# Patient Record
Sex: MALE | Race: ASIAN | NOT HISPANIC OR LATINO | URBAN - METROPOLITAN AREA
[De-identification: names, ages, dates, MRNs, and addresses within clinical notes are randomized per-mention and may not be internally consistent; named-entity substitution may affect disease eponyms.]

---

## 2024-01-02 ENCOUNTER — EMERGENCY (EMERGENCY)
Facility: HOSPITAL | Age: 24
LOS: 1 days | Discharge: HOME | End: 2024-01-02
Attending: STUDENT IN AN ORGANIZED HEALTH CARE EDUCATION/TRAINING PROGRAM | Admitting: STUDENT IN AN ORGANIZED HEALTH CARE EDUCATION/TRAINING PROGRAM
Payer: SELF-PAY

## 2024-01-02 VITALS
TEMPERATURE: 98 F | RESPIRATION RATE: 17 BRPM | DIASTOLIC BLOOD PRESSURE: 75 MMHG | HEART RATE: 90 BPM | OXYGEN SATURATION: 100 % | SYSTOLIC BLOOD PRESSURE: 116 MMHG

## 2024-01-02 PROCEDURE — 99284 EMERGENCY DEPT VISIT MOD MDM: CPT

## 2024-01-02 PROCEDURE — 99053 MED SERV 10PM-8AM 24 HR FAC: CPT

## 2024-01-02 NOTE — ED ADULT TRIAGE NOTE - CHIEF COMPLAINT QUOTE
Pt c/o toe pain x 1 day. Pt states he tripped and fell down 3 steps. Denies hitting head. No Past Medical History.

## 2024-01-03 VITALS
OXYGEN SATURATION: 98 % | HEART RATE: 85 BPM | TEMPERATURE: 98 F | RESPIRATION RATE: 16 BRPM | SYSTOLIC BLOOD PRESSURE: 139 MMHG | DIASTOLIC BLOOD PRESSURE: 85 MMHG

## 2024-01-03 PROCEDURE — 73630 X-RAY EXAM OF FOOT: CPT | Mod: 26,LT

## 2024-01-03 PROCEDURE — 73660 X-RAY EXAM OF TOE(S): CPT | Mod: 26,LT,76

## 2024-01-03 RX ORDER — OXYCODONE HYDROCHLORIDE 5 MG/1
5 TABLET ORAL ONCE
Refills: 0 | Status: DISCONTINUED | OUTPATIENT
Start: 2024-01-03 | End: 2024-01-03

## 2024-01-03 RX ORDER — IBUPROFEN 200 MG
400 TABLET ORAL ONCE
Refills: 0 | Status: COMPLETED | OUTPATIENT
Start: 2024-01-03 | End: 2024-01-03

## 2024-01-03 RX ADMIN — OXYCODONE HYDROCHLORIDE 5 MILLIGRAM(S): 5 TABLET ORAL at 04:18

## 2024-01-03 RX ADMIN — Medication 400 MILLIGRAM(S): at 03:14

## 2024-01-03 NOTE — ED PROVIDER NOTE - PHYSICAL EXAMINATION
Afebrile, well appearing, neck supple, no rash, rrr, ctabl, abdomen soft and ndnt, no le edema, stable gait.  L Foot: tenderness and ecchymosis to the 5th toe. No open wounds. SILT over S/S/T/DP/SP. Palp DP/PT pulses

## 2024-01-03 NOTE — ED PROVIDER NOTE - OBJECTIVE STATEMENT
23M, denies pmh, who presents with Left 5th toe pain s/p fall down stairs. No blood thinners. Denies any other bodily or head trauma. On ROS, denies headaches, fevers, chills, cough, sputum, cp, sob, abdominal pain, nvd, dysuria, hematuria, recent travel, syncope, black/bloody stools.

## 2024-01-03 NOTE — CONSULT NOTE ADULT - SUBJECTIVE AND OBJECTIVE BOX
· Chief Complaint: The patient is a 23y Male complaining of toe pain.  · HPI Objective Statement: 23M, denies pmh, who presents with Left 5th toe pain s/p fall down stairs. No blood thinners. Denies any other bodily or head trauma. On ROS, denies headaches, fevers, chills, cough, sputum, cp, sob, abdominal pain, nvd, dysuria, hematuria, recent travel, syncope, black/bloody stools.      PAST MEDICAL & SURGICAL HISTORY:      MEDICATIONS  (STANDING):    MEDICATIONS  (PRN):      Allergies    No Known Allergies    Intolerances        VITALS:    Vital Signs Last 24 Hrs  T(C): 36.7 (03 Jan 2024 03:38), Max: 37 (03 Jan 2024 00:13)  T(F): 98.1 (03 Jan 2024 03:38), Max: 98.6 (03 Jan 2024 00:13)  HR: 85 (03 Jan 2024 03:38) (81 - 90)  BP: 139/85 (03 Jan 2024 03:38) (116/75 - 139/85)  BP(mean): 74 (03 Jan 2024 00:13) (74 - 74)  RR: 16 (03 Jan 2024 03:38) (16 - 20)  SpO2: 98% (03 Jan 2024 03:38) (98% - 100%)    Parameters below as of 03 Jan 2024 03:38  Patient On (Oxygen Delivery Method): room air        LABS:                CAPILLARY BLOOD GLUCOSE              LOWER EXTREMITY PHYSICAL EXAM:    Vascular: DP/PT 2/4, B/L, CFT <3 seconds B/L, Temperature gradient warm to cool , B/L.   Neuro: Epicritic sensation intac to the level of digits, B/L.  Musculoskeletal/Ortho: Localized tenderness along the L foot 5th DIPJ, pt is able to dorsiflex and plantar L foot 5th digit  Skin: swelling and ecchymosis along the 5th digit, no open wounds, no acute signs of infection    RADIOLOGY & ADDITIONAL STUDIES:  < from: Xray Toes, Left Foot (01.03.24 @ 05:27) >    ******PRELIMINARY REPORT******      ******PRELIMINARY REPORT******         ACC: [id number] EXAM:  XR TOE(S) MIN 2 VIEWS LT   ORDERED BY: BREONNA FINLEY     PROCEDURE DATE:  01/03/2024    ******PRELIMINARY REPORT******      ******PRELIMINARY REPORT******           INTERPRETATION:  CLINICAL INDICATION: Status post reduction of 5th toe.    TECHNIQUE: 4 images of the left toe.    COMPARISON: Radiographs of the left toe from 1/3/2024.    FINDINGS:  Interval reduction with lateral subluxation of the leftfifth PIP joint.  No acute fracture.    IMPRESSION:  Lateral subluxation of the left fifth PIP joint.        ******PRELIMINARY REPORT******      ******PRELIMINARY REPORT******       DENA SAM MD; Resident Radiologist  This document is a PRELIMINARY interpretation and is pending final   attending approval. Naveed  3 2024  5:27AM    < end of copied text >     · Chief Complaint: The patient is a 23y Male complaining of toe pain.  · HPI Objective Statement: 23M, denies pmh, who presents with Left 5th toe pain s/p fall down stairs. No blood thinners. Denies any other bodily or head trauma. On ROS, denies headaches, fevers, chills, cough, sputum, cp, sob, abdominal pain, nvd, dysuria, hematuria, recent travel, syncope, black/bloody stools.      PAST MEDICAL & SURGICAL HISTORY:      MEDICATIONS  (STANDING):    MEDICATIONS  (PRN):      Allergies    No Known Allergies    Intolerances        VITALS:    Vital Signs Last 24 Hrs  T(C): 36.7 (03 Jan 2024 03:38), Max: 37 (03 Jan 2024 00:13)  T(F): 98.1 (03 Jan 2024 03:38), Max: 98.6 (03 Jan 2024 00:13)  HR: 85 (03 Jan 2024 03:38) (81 - 90)  BP: 139/85 (03 Jan 2024 03:38) (116/75 - 139/85)  BP(mean): 74 (03 Jan 2024 00:13) (74 - 74)  RR: 16 (03 Jan 2024 03:38) (16 - 20)  SpO2: 98% (03 Jan 2024 03:38) (98% - 100%)    Parameters below as of 03 Jan 2024 03:38  Patient On (Oxygen Delivery Method): room air        LABS:                CAPILLARY BLOOD GLUCOSE              LOWER EXTREMITY PHYSICAL EXAM:    Vascular: DP/PT 2/4, B/L, CFT <3 seconds B/L, Temperature gradient warm to cool , B/L.   Neuro: Epicritic sensation intac to the level of digits, B/L.  Musculoskeletal/Ortho: Localized tenderness along the L foot 5th DIPJ, pt is able to dorsiflex and plantar L foot 5th digit  Skin: swelling and ecchymosis along the 5th digit, no open wounds, no acute signs of infection    RADIOLOGY & ADDITIONAL STUDIES:  < from: Xray Toes, Left Foot (01.03.24 @ 05:27) >    ******PRELIMINARY REPORT******      ******PRELIMINARY REPORT******         ACC: [id number] EXAM:  XR TOE(S) MIN 2 VIEWS LT   ORDERED BY: BREONNA FINLEY     PROCEDURE DATE:  01/03/2024    ******PRELIMINARY REPORT******      ******PRELIMINARY REPORT******           INTERPRETATION:  CLINICAL INDICATION: Status post reduction of 5th toe.    TECHNIQUE: 4 images of the left toe.    COMPARISON: Radiographs of the left toe from 1/3/2024.    FINDINGS:  Interval reduction with lateral subluxation of the leftfifth PIP joint.  No acute fracture.    IMPRESSION:  Lateral subluxation of the left fifth PIP joint.        ******PRELIMINARY REPORT******      ******PRELIMINARY REPORT******       DENA SAM MD; Resident Radiologist  This document is a PRELIMINARY interpretation and is pending final   attending approval. Naveed  3 2024  5:27AM    < end of copied text >

## 2024-01-03 NOTE — ED PROVIDER NOTE - CLINICAL SUMMARY MEDICAL DECISION MAKING FREE TEXT BOX
23M, denies pmh, who presents with Left 5th toe pain s/p fall down stairs. No blood thinners. Denies any other bodily or head trauma. On ROS, denies headaches, fevers, chills, cough, sputum, cp, sob, abdominal pain, nvd, dysuria, hematuria, recent travel, syncope, black/bloody stools. Physical significant for reproducible L 5th toe pain. Will obtain plain films to assess for bony injury.

## 2024-01-03 NOTE — ED PROVIDER NOTE - NSFOLLOWUPINSTRUCTIONS_ED_ALL_ED_FT
TAKE TYLENOL 650mg EVERY 4 HOURS AS NEEDED FOR MILD PAIN  TAKE MOTRIN 600mg EVERY 6 HOURS AS NEEDED FOR MODERATE PAIN    FOLLOW UP WITH YOUR PRIMARY CARE PROVIDER WITHIN 1 WEEK.    RETURN TO THE EMERGENCY DEPARTMENT FOR ANY WORSENING SYMPTOMS. TAKE TYLENOL 650mg EVERY 4 HOURS AS NEEDED FOR MILD PAIN  TAKE MOTRIN 600mg EVERY 6 HOURS AS NEEDED FOR MODERATE PAIN    FOLLOW UP WITH YOUR PRIMARY CARE PROVIDER WITHIN 1 WEEK.    RETURN TO THE EMERGENCY DEPARTMENT FOR ANY WORSENING SYMPTOMS.    Please follow up with Dr. Villanueva  Address: [address] [place] [address] [address], [place] [address]  Phone: [phone] TAKE TYLENOL 650mg EVERY 4 HOURS AS NEEDED FOR MILD PAIN  TAKE MOTRIN 600mg EVERY 6 HOURS AS NEEDED FOR MODERATE PAIN    FOLLOW UP WITH YOUR PRIMARY CARE PROVIDER WITHIN 1 WEEK.    RETURN TO THE EMERGENCY DEPARTMENT FOR ANY WORSENING SYMPTOMS.    Please follow up with Dr. Villanueva  Address: [address] [place] [address] [address], [place] [address]  Phone: [phone]

## 2024-01-03 NOTE — ED ADULT NURSE NOTE - OBJECTIVE STATEMENT
pt presents to ED A&04 ambulatory coming in complaining of left pinky toe pain. Patinet states he tripped and fell down 3 steps, denies hitting head/LOC. Respirations even and unlabored. Lung sounds clear with equal chest rise bilaterally. No complaints of chest pain, headache, nausea, dizziness, vomiting  SOB, fever, chills verbalized. medicated as ordered awaiting XR

## 2024-01-03 NOTE — CONSULT NOTE ADULT - ASSESSMENT
24 yo M presented with L foot 5th digit acute lateral subluxed  DIPJ   - Afebrile, neurovascular status intact   - Localized tenderness along the L foot 5th DIPJ, pt is able to dorsiflex and plantar L foot 5th digit, swelling and ecchymosis along the 5th digit, no open wounds, no acute signs of infection  - Left foot X-ray: 5th digit acute lateral subluxed  DIPJ   - After obtaining verbal consent, closed reduction attempted  - postreduction Left foot X-ray: successful reduction of the 5th digit DIPJ (resident's read)  - Applied Ekon splint  - Recommended surgical shoe and weightbearing as tolerated  - Please call [phone] within a week to make an appointment with Dr. Villanueva  - Discussed with Attending  22 yo M presented with L foot 5th digit acute lateral subluxed  DIPJ   - Afebrile, neurovascular status intact   - Localized tenderness along the L foot 5th DIPJ, pt is able to dorsiflex and plantar L foot 5th digit, swelling and ecchymosis along the 5th digit, no open wounds, no acute signs of infection  - Left foot X-ray: 5th digit acute lateral subluxed  DIPJ   - After obtaining verbal consent, closed reduction attempted  - postreduction Left foot X-ray: successful reduction of the 5th digit DIPJ (resident's read)  - Applied Keon splint  - Recommended surgical shoe and weightbearing as tolerated  - Please call [phone] within a week to make an appointment with Dr. Villanueva  - Discussed with Attending

## 2024-01-03 NOTE — ED PROVIDER NOTE - PATIENT PORTAL LINK FT
You can access the FollowMyHealth Patient Portal offered by [facility] by registering at the following website: [email or web address] By joining "[employer]"’s FollowMyHealth portal, you will also be able to view your health information using other applications (apps) compatible with our system. You can access the FollowMyHealth Patient Portal offered by [facility] by registering at the following website: [email or web address]. By joining [employer]’s FollowMyHealth portal, you will also be able to view your health information using other applications (apps) compatible with our system.

## 2024-09-08 ENCOUNTER — EMERGENCY (EMERGENCY)
Facility: HOSPITAL | Age: 24
LOS: 1 days | Discharge: HOME | End: 2024-09-08
Admitting: EMERGENCY MEDICINE
Payer: SELF-PAY

## 2024-09-08 VITALS
HEART RATE: 87 BPM | DIASTOLIC BLOOD PRESSURE: 73 MMHG | HEIGHT: 68 IN | OXYGEN SATURATION: 97 % | TEMPERATURE: 98 F | SYSTOLIC BLOOD PRESSURE: 129 MMHG | WEIGHT: 139.99 LBS | RESPIRATION RATE: 18 BRPM

## 2024-09-08 PROCEDURE — 99053 MED SERV 10PM-8AM 24 HR FAC: CPT

## 2024-09-08 PROCEDURE — 99284 EMERGENCY DEPT VISIT MOD MDM: CPT
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
## 2024-09-09 PROCEDURE — 73552 X-RAY EXAM OF FEMUR 2/>: CPT | Mod: 26,RT

## 2024-09-09 PROCEDURE — 73020 X-RAY EXAM OF SHOULDER: CPT | Mod: 26,LT

## 2024-09-09 PROCEDURE — 73030 X-RAY EXAM OF SHOULDER: CPT | Mod: 26,LT
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
## 2024-09-09 NOTE — ED PROVIDER NOTE - PATIENT PORTAL LINK FT
You can access the FollowMyHealth Patient Portal offered by [facility] by registering at the following website: [email or web address]. By joining [facility]’s FollowMyHealth portal, you will also be able to view your health information using other applications (apps) compatible with our system.
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
